# Patient Record
Sex: MALE | Race: WHITE | NOT HISPANIC OR LATINO | URBAN - METROPOLITAN AREA
[De-identification: names, ages, dates, MRNs, and addresses within clinical notes are randomized per-mention and may not be internally consistent; named-entity substitution may affect disease eponyms.]

---

## 2024-10-09 VITALS
HEIGHT: 72 IN | SYSTOLIC BLOOD PRESSURE: 130 MMHG | OXYGEN SATURATION: 96 % | TEMPERATURE: 97 F | WEIGHT: 300.05 LBS | RESPIRATION RATE: 17 BRPM | HEART RATE: 76 BPM | DIASTOLIC BLOOD PRESSURE: 80 MMHG

## 2024-10-09 RX ORDER — ASPIRIN/MAG CARB/ALUMINUM AMIN 325 MG
81 TABLET ORAL ONCE
Refills: 0 | Status: DISCONTINUED | OUTPATIENT
Start: 2024-10-21 | End: 2024-11-04

## 2024-10-09 RX ORDER — CHLORHEXIDINE GLUCONATE 40 MG/ML
1 SOLUTION TOPICAL ONCE
Refills: 0 | Status: DISCONTINUED | OUTPATIENT
Start: 2024-10-21 | End: 2024-11-04

## 2024-10-09 RX ORDER — CLOPIDOGREL 75 MG/1
600 TABLET ORAL ONCE
Refills: 0 | Status: DISCONTINUED | OUTPATIENT
Start: 2024-10-21 | End: 2024-11-04

## 2024-10-09 NOTE — H&P ADULT - ASSESSMENT
54 yo M, obese, PMHx only notable for HLD, presented to Dr Salazar with c/o SOB after walking up a few flights of stairs of walking a few blocks that he has been experiencing for several weeks, who now presents for cardiac catheterization with possible intervention if clinically indicated in light of risk factors, CCS class II anginal-equivalent symptoms and abnormal EST.    - ASA II Mallampati II  - VSS  - Patient is a candidate for moderate sedation  - Labs reviewed by PA - H/H 14.6/44.3   K 4.3   Mg 2.1  - GFR/Cr 16/0.97  - EKG - sinus rhythm, HR 93, TWI leads II, III, aVF, V5, V6  - LOAD - Patient is compliant with ASA and Plavix which he started 2 weeks ago, took both medications this morning  - FLUIDS -  ml bolus given x1; NS 75 ml/hr given x 2hr     Risks & benefits of procedure and alternative therapy have been explained to the patient including but not limited to: allergic reaction, bleeding w/possible need for blood transfusion, infection, renal and vascular compromise, limb damage, arrhythmia, stroke, vessel dissection/perforation, Myocardial infarction, emergent CABG. Informed consent obtained and in chart.

## 2024-10-09 NOTE — H&P ADULT - HISTORY OF PRESENT ILLNESS
Pharmacy: _______  Cardiologist: Dr Saalzar  Escort: _______    Ordered for ASA 81 mg / Plavix 600 mg,  ml bolus and 75 ml/hr x 2 hours maintenance    52 yo M, obese, PMHx only notable for HLD, presented to Dr Salazar with c/o SOB that he has been experiencing for several weeks/months. Patient otherwise denies CP, dizziness, palpitations, orthopnea/PND, leg swelling, LOC, bleeding, melena/hematochezia, fever, chills, URI symptoms, or recent illness.     Past Testing:  - TTE 10/1/2024 - mildly dilated LV with LVEF 50%, G2DD, mild AS 1.9 cm2,   - EST (no date listed) - positive stress test by exercise criteria - 1 mm STD in lateral leads    In light of risk factors, CCS class ____ anginal-equivalent symptoms and abnormal EST, pt now presents for cardiac catheterization with possible intervention if clinically indicated.      Meds: Aspirin 81 mg, Crestor 5 mg, Toprol 25 mg,    Cardiologist: Dr Salazar  Pharmacy:   Escort:     54 yo M, obese, PMHx only notable for HLD, presented to Dr Salazar with c/o SOB that he has been experiencing for several weeks/months. Patient otherwise denies CP, dizziness, palpitations, orthopnea/PND, leg swelling, LOC, bleeding, melena/hematochezia, fever, chills, URI symptoms, or recent illness.     Past Testing:  - TTE 10/1/2024 - mildly dilated LV with LVEF 50%, G2DD, mild AS, trace-mild MR, trace TR   - EST 10/7/24 - positive stress test by exercise criteria - 1 mm STD in lateral leads     In light of risk factors, CCS class ____ anginal-equivalent symptoms and abnormal EST, pt now presents for cardiac catheterization with possible intervention if clinically indicated. Cardiologist: Dr Salazar  Pharmacy: [facility] [address]  Escort: wife Lora    54 yo M, obese, PMHx only notable for HLD, presented to Dr Salazar with c/o SOB after walking up a few flights of stairs of walking a few blocks that he has been experiencing for several weeks. The patient walks about 4 miles daily and feels multiple episodes of SOB while walking. He also endorses episodes of excessive perspiration especially when eating, for the last 2 weeks, per patient he runs warm usually but has been perspiring more lately. Patient otherwise denies CP, dizziness, palpitations, orthopnea/PND, leg swelling, LOC, bleeding, melena/hematochezia, fever, chills, URI symptoms, or recent illness.     Past Testing:  - TTE 10/1/2024 - mildly dilated LV with LVEF 50%, G2DD, mild AS, trace-mild MR, trace TR   - EST 10/7/24 - positive stress test by exercise criteria - 1 mm STD in lateral leads     In light of risk factors, CCS class II anginal-equivalent symptoms and abnormal EST, pt now presents for cardiac catheterization with possible intervention if clinically indicated. Cardiologist: Dr Salazar  Pharmacy: [facility] [address]  Escort: wife Lora    54 yo M, obese, PMHx only notable for HLD, presented to Dr Salazar with c/o SOB after walking up a few flights of stairs of walking a few blocks that he has been experiencing for several weeks. The patient walks about 4 miles daily and feels multiple episodes of SOB while walking. He also endorses fatigue, occasionally waking up at night feeling SOB, and episodes of excessive perspiration especially when eating for the last 2 weeks, per patient he runs warm usually but has been perspiring more lately. Patient reports a productive cough which his PCP has attributed to post nasal drip but otherwise denies CP, dizziness, palpitations, orthopnea, leg swelling, LOC, bleeding, hematuria, melena/hematochezia, fever, chills, or recent illness.     Past Testing:  - TTE 10/1/2024 - mildly dilated LV with LVEF 50%, G2DD, mild AS, trace-mild MR, trace TR   - EST 10/7/24 - positive stress test by exercise criteria - 1 mm STD in lateral leads     In light of risk factors, CCS class II anginal-equivalent symptoms and abnormal EST, pt now presents for cardiac catheterization with possible intervention if clinically indicated.

## 2024-10-09 NOTE — H&P ADULT - NSHPLABSRESULTS_GEN_ALL_CORE
14.6   8.84  )-----------( 264      ( 21 Oct 2024 06:40 )             44.3       10-21    140  |  104  |  16  ----------------------------<  139[H]  4.3   |  25  |  0.97    Ca    9.1      21 Oct 2024 06:40  Mg     2.1     10-21    TPro  6.9  /  Alb  4.2  /  TBili  0.8  /  DBili  x   /  AST  22  /  ALT  30  /  AlkPhos  53  10-21      PT/INR - ( 21 Oct 2024 06:40 )   PT: 10.2 sec;   INR: 0.87          PTT - ( 21 Oct 2024 06:40 )  PTT:33.3 sec    CARDIAC MARKERS ( 21 Oct 2024 06:40 )  x     / x     / x     / x     / 5.0 ng/mL      Urinalysis Basic - ( 21 Oct 2024 06:40 )    Color: x / Appearance: x / SG: x / pH: x  Gluc: 139 mg/dL / Ketone: x  / Bili: x / Urobili: x   Blood: x / Protein: x / Nitrite: x   Leuk Esterase: x / RBC: x / WBC x   Sq Epi: x / Non Sq Epi: x / Bacteria: x      EKG: sinus rhythm, HR 93TWI II, III, aVF, V5, V6

## 2024-10-21 ENCOUNTER — OUTPATIENT (OUTPATIENT)
Dept: OUTPATIENT SERVICES | Facility: HOSPITAL | Age: 54
LOS: 1 days | Discharge: HOME | End: 2024-10-21
Payer: COMMERCIAL

## 2024-10-21 LAB
ALBUMIN SERPL ELPH-MCNC: 4.2 G/DL — SIGNIFICANT CHANGE UP (ref 3.3–5)
ALP SERPL-CCNC: 53 U/L — SIGNIFICANT CHANGE UP (ref 40–120)
ALT FLD-CCNC: 30 U/L — SIGNIFICANT CHANGE UP (ref 10–45)
ANION GAP SERPL CALC-SCNC: 11 MMOL/L — SIGNIFICANT CHANGE UP (ref 5–17)
APTT BLD: 33.3 SEC — SIGNIFICANT CHANGE UP (ref 24.5–35.6)
AST SERPL-CCNC: 22 U/L — SIGNIFICANT CHANGE UP (ref 10–40)
BASOPHILS # BLD AUTO: 0.04 K/UL — SIGNIFICANT CHANGE UP (ref 0–0.2)
BASOPHILS NFR BLD AUTO: 0.5 % — SIGNIFICANT CHANGE UP (ref 0–2)
BILIRUB SERPL-MCNC: 0.8 MG/DL — SIGNIFICANT CHANGE UP (ref 0.2–1.2)
BLOOD GAS VENOUS - BLOOD UREA NITROGEN: 16 MG/DL — SIGNIFICANT CHANGE UP (ref 7–23)
BLOOD GAS VENOUS - BLOOD UREA NITROGEN: 16 MG/DL — SIGNIFICANT CHANGE UP (ref 7–23)
BLOOD GAS VENOUS - CREATININE: 1 MG/DL — SIGNIFICANT CHANGE UP (ref 0.5–1.3)
BLOOD GAS VENOUS - CREATININE: 1 MG/DL — SIGNIFICANT CHANGE UP (ref 0.5–1.3)
BUN SERPL-MCNC: 16 MG/DL — SIGNIFICANT CHANGE UP (ref 7–23)
CA-I SERPL-SCNC: 1.22 MMOL/L — SIGNIFICANT CHANGE UP (ref 1.15–1.33)
CA-I SERPL-SCNC: 1.22 MMOL/L — SIGNIFICANT CHANGE UP (ref 1.15–1.33)
CALCIUM SERPL-MCNC: 9.1 MG/DL — SIGNIFICANT CHANGE UP (ref 8.4–10.5)
CHLORIDE BLDV-SCNC: 106 MMOL/L — SIGNIFICANT CHANGE UP (ref 96–108)
CHLORIDE BLDV-SCNC: 106 MMOL/L — SIGNIFICANT CHANGE UP (ref 96–108)
CHLORIDE SERPL-SCNC: 104 MMOL/L — SIGNIFICANT CHANGE UP (ref 96–108)
CHOLEST SERPL-MCNC: 145 MG/DL — SIGNIFICANT CHANGE UP
CK MB CFR SERPL CALC: 5 NG/ML — SIGNIFICANT CHANGE UP (ref 0–6.7)
CK SERPL-CCNC: 163 U/L — SIGNIFICANT CHANGE UP (ref 30–200)
CO2 BLDV-SCNC: 25 MMOL/L — SIGNIFICANT CHANGE UP (ref 22–26)
CO2 BLDV-SCNC: 25 MMOL/L — SIGNIFICANT CHANGE UP (ref 22–26)
CO2 SERPL-SCNC: 25 MMOL/L — SIGNIFICANT CHANGE UP (ref 22–31)
CREAT SERPL-MCNC: 0.97 MG/DL — SIGNIFICANT CHANGE UP (ref 0.5–1.3)
EGFR: 95 ML/MIN/1.73M2 — SIGNIFICANT CHANGE UP
EOSINOPHIL # BLD AUTO: 0.25 K/UL — SIGNIFICANT CHANGE UP (ref 0–0.5)
EOSINOPHIL NFR BLD AUTO: 2.8 % — SIGNIFICANT CHANGE UP (ref 0–6)
GAS PNL BLDV: 136 MMOL/L — SIGNIFICANT CHANGE UP (ref 136–145)
GAS PNL BLDV: 136 MMOL/L — SIGNIFICANT CHANGE UP (ref 136–145)
GLUCOSE BLDV-MCNC: 139 MG/DL — HIGH (ref 70–99)
GLUCOSE BLDV-MCNC: 139 MG/DL — HIGH (ref 70–99)
GLUCOSE SERPL-MCNC: 139 MG/DL — HIGH (ref 70–99)
HCT VFR BLD CALC: 44.3 % — SIGNIFICANT CHANGE UP (ref 39–50)
HCT VFR BLDA CALC: 41 % — SIGNIFICANT CHANGE UP
HCT VFR BLDA CALC: 41 % — SIGNIFICANT CHANGE UP
HDLC SERPL-MCNC: 61 MG/DL — SIGNIFICANT CHANGE UP
HGB BLD-MCNC: 14.6 G/DL — SIGNIFICANT CHANGE UP (ref 13–17)
IMM GRANULOCYTES NFR BLD AUTO: 0.3 % — SIGNIFICANT CHANGE UP (ref 0–0.9)
INR BLD: 0.87 — SIGNIFICANT CHANGE UP (ref 0.85–1.16)
LACTATE BLDV-MCNC: 1.3 MMOL/L — SIGNIFICANT CHANGE UP (ref 0.5–2)
LACTATE BLDV-MCNC: 1.3 MMOL/L — SIGNIFICANT CHANGE UP (ref 0.5–2)
LIPID PNL WITH DIRECT LDL SERPL: 69 MG/DL — SIGNIFICANT CHANGE UP
LYMPHOCYTES # BLD AUTO: 2.41 K/UL — SIGNIFICANT CHANGE UP (ref 1–3.3)
LYMPHOCYTES # BLD AUTO: 27.3 % — SIGNIFICANT CHANGE UP (ref 13–44)
MAGNESIUM SERPL-MCNC: 2.1 MG/DL — SIGNIFICANT CHANGE UP (ref 1.6–2.6)
MCHC RBC-ENTMCNC: 31.8 PG — SIGNIFICANT CHANGE UP (ref 27–34)
MCHC RBC-ENTMCNC: 33 GM/DL — SIGNIFICANT CHANGE UP (ref 32–36)
MCV RBC AUTO: 96.5 FL — SIGNIFICANT CHANGE UP (ref 80–100)
MONOCYTES # BLD AUTO: 0.74 K/UL — SIGNIFICANT CHANGE UP (ref 0–0.9)
MONOCYTES NFR BLD AUTO: 8.4 % — SIGNIFICANT CHANGE UP (ref 2–14)
NEUTROPHILS # BLD AUTO: 5.37 K/UL — SIGNIFICANT CHANGE UP (ref 1.8–7.4)
NEUTROPHILS NFR BLD AUTO: 60.7 % — SIGNIFICANT CHANGE UP (ref 43–77)
NON HDL CHOLESTEROL: 84 MG/DL — SIGNIFICANT CHANGE UP
NRBC # BLD: 0 /100 WBCS — SIGNIFICANT CHANGE UP (ref 0–0)
PCO2 BLDV: 49 MMHG — SIGNIFICANT CHANGE UP (ref 42–55)
PCO2 BLDV: 49 MMHG — SIGNIFICANT CHANGE UP (ref 42–55)
PH BLDV: 7.37 — SIGNIFICANT CHANGE UP (ref 7.32–7.43)
PH BLDV: 7.37 — SIGNIFICANT CHANGE UP (ref 7.32–7.43)
PLATELET # BLD AUTO: 264 K/UL — SIGNIFICANT CHANGE UP (ref 150–400)
POCT ISTAT CREATININE: 1 MG/DL — SIGNIFICANT CHANGE UP (ref 0.5–1.3)
POTASSIUM BLDV-SCNC: 4.5 MMOL/L — SIGNIFICANT CHANGE UP (ref 3.5–5.1)
POTASSIUM BLDV-SCNC: 4.5 MMOL/L — SIGNIFICANT CHANGE UP (ref 3.5–5.1)
POTASSIUM SERPL-MCNC: 4.3 MMOL/L — SIGNIFICANT CHANGE UP (ref 3.5–5.3)
POTASSIUM SERPL-SCNC: 4.3 MMOL/L — SIGNIFICANT CHANGE UP (ref 3.5–5.3)
PROT SERPL-MCNC: 6.9 G/DL — SIGNIFICANT CHANGE UP (ref 6–8.3)
PROTHROM AB SERPL-ACNC: 10.2 SEC — SIGNIFICANT CHANGE UP (ref 9.9–13.4)
RBC # BLD: 4.59 M/UL — SIGNIFICANT CHANGE UP (ref 4.2–5.8)
RBC # FLD: 12.2 % — SIGNIFICANT CHANGE UP (ref 10.3–14.5)
SODIUM SERPL-SCNC: 140 MMOL/L — SIGNIFICANT CHANGE UP (ref 135–145)
TRIGL SERPL-MCNC: 77 MG/DL — SIGNIFICANT CHANGE UP
WBC # BLD: 8.84 K/UL — SIGNIFICANT CHANGE UP (ref 3.8–10.5)
WBC # FLD AUTO: 8.84 K/UL — SIGNIFICANT CHANGE UP (ref 3.8–10.5)

## 2024-10-21 PROCEDURE — 82550 ASSAY OF CK (CPK): CPT

## 2024-10-21 PROCEDURE — 93458 L HRT ARTERY/VENTRICLE ANGIO: CPT

## 2024-10-21 PROCEDURE — C1769: CPT

## 2024-10-21 PROCEDURE — 80053 COMPREHEN METABOLIC PANEL: CPT

## 2024-10-21 PROCEDURE — C1887: CPT

## 2024-10-21 PROCEDURE — 83735 ASSAY OF MAGNESIUM: CPT

## 2024-10-21 PROCEDURE — 85730 THROMBOPLASTIN TIME PARTIAL: CPT

## 2024-10-21 PROCEDURE — 93458 L HRT ARTERY/VENTRICLE ANGIO: CPT | Mod: 26

## 2024-10-21 PROCEDURE — 85610 PROTHROMBIN TIME: CPT

## 2024-10-21 PROCEDURE — 80061 LIPID PANEL: CPT

## 2024-10-21 PROCEDURE — 99152 MOD SED SAME PHYS/QHP 5/>YRS: CPT

## 2024-10-21 PROCEDURE — C1894: CPT

## 2024-10-21 PROCEDURE — 82553 CREATINE MB FRACTION: CPT

## 2024-10-21 PROCEDURE — 85025 COMPLETE CBC W/AUTO DIFF WBC: CPT

## 2024-10-21 PROCEDURE — 82565 ASSAY OF CREATININE: CPT

## 2024-10-21 RX ORDER — SODIUM CHLORIDE 9 MG/ML
1000 INJECTION, SOLUTION INTRAMUSCULAR; INTRAVENOUS; SUBCUTANEOUS
Refills: 0 | Status: DISCONTINUED | OUTPATIENT
Start: 2024-10-21 | End: 2024-11-04

## 2024-10-21 RX ORDER — SODIUM CHLORIDE 9 MG/ML
1000 INJECTION, SOLUTION INTRAMUSCULAR; INTRAVENOUS; SUBCUTANEOUS
Refills: 0 | Status: DISCONTINUED | OUTPATIENT
Start: 2024-10-21 | End: 2024-10-21

## 2024-10-21 RX ORDER — METOPROLOL TARTRATE 50 MG
1 TABLET ORAL
Refills: 0 | DISCHARGE

## 2024-10-21 RX ORDER — ASPIRIN/MAG CARB/ALUMINUM AMIN 325 MG
1 TABLET ORAL
Refills: 0 | DISCHARGE

## 2024-10-21 RX ORDER — SODIUM CHLORIDE 9 MG/ML
250 INJECTION, SOLUTION INTRAMUSCULAR; INTRAVENOUS; SUBCUTANEOUS ONCE
Refills: 0 | Status: COMPLETED | OUTPATIENT
Start: 2024-10-21 | End: 2024-10-21

## 2024-10-21 RX ORDER — RANOLAZINE 500 MG/1
1 TABLET, FILM COATED, EXTENDED RELEASE ORAL
Refills: 0 | DISCHARGE

## 2024-10-21 RX ORDER — CLOPIDOGREL 75 MG/1
1 TABLET ORAL
Refills: 0 | DISCHARGE

## 2024-10-21 RX ORDER — ROSUVASTATIN CALCIUM 10 MG
1 TABLET ORAL
Refills: 0 | DISCHARGE

## 2024-10-21 RX ADMIN — SODIUM CHLORIDE 125 MILLILITER(S): 9 INJECTION, SOLUTION INTRAMUSCULAR; INTRAVENOUS; SUBCUTANEOUS at 09:21

## 2024-10-21 RX ADMIN — SODIUM CHLORIDE 1000 MILLILITER(S): 9 INJECTION, SOLUTION INTRAMUSCULAR; INTRAVENOUS; SUBCUTANEOUS at 07:39

## 2024-10-21 RX ADMIN — SODIUM CHLORIDE 75 MILLILITER(S): 9 INJECTION, SOLUTION INTRAMUSCULAR; INTRAVENOUS; SUBCUTANEOUS at 07:39

## 2024-10-21 NOTE — PROGRESS NOTE ADULT - SUBJECTIVE AND OBJECTIVE BOX
INTERVENTIONAL CARDIOLOGY PA SDA DISCHARGE NOTE    Patient without complaints. Ambulated and voided without difficulties    Afebrile, VSS    PHYSICAL EXAM:    Ext - Rt Radial: Hemostasis achieved with manual release of Hemoband. Dressing C/D/I -- no oozing, bleeding, or hematoma noted. Sensations intact with 2+ radial pulses    		      MEDICATIONS:  ASA 81mg QD   Plavix 75mg QD  Rosuvastatin 20mg QD  Metoprolol XL 100mg QD  Ranolazine 500mg QD    A/P:      53M with hx of HLD, presented to Dr Salazar with c/o SOB after walking up a few flights of stairs of walking a few blocks that he has been experiencing for several weeks, who now presents for cardiac catheterization with possible intervention if clinically indicated in light of risk factors, CCS class II anginal-equivalent symptoms and abnormal EST.    Pt is now s/p LHC (10/21/24): LM/LAD/LCx MLI and dRCA 30%; EDP 23 -- via [facility] [IC: Dr. SON Cervantes / Fellow: SON Clemons]      - Stable for discharge as per attending Dr. Cervantes after bed rest, pt voids, groin/wrist stable and 30 minutes of ambulation.  - Follow-up with PMD/Cardiologist Dr. Salazar in 1-2 weeks  - Discharged forms signed and copies in chart      INTERVENTIONAL CARDIOLOGY PA SDA DISCHARGE NOTE    Patient without complaints. Ambulated and voided without difficulties    Afebrile, VSS    PHYSICAL EXAM:    Ext - Rt Radial: Hemostasis achieved with manual release of Hemoband. Dressing C/D/I -- no oozing, bleeding, or hematoma noted. Sensations intact with 2+ radial pulses    		      MEDICATIONS:  ASA 81mg QD   Plavix 75mg QD  Rosuvastatin 20mg QD  Metoprolol XL 100mg QD  Ranolazine 500mg QD    A/P:      53M with hx of HLD, presented to Dr Salazar with c/o SOB after walking up a few flights of stairs of walking a few blocks that he has been experiencing for several weeks, who now presents for cardiac catheterization with possible intervention if clinically indicated in light of risk factors, CCS class II anginal-equivalent symptoms and abnormal EST.    Pt is now s/p LHC (10/21/24): LM/LAD/LCx MLI and dRCA 30%; EDP 23 -- via [facility] [IC: Dr. SON Cervantes / Fellow: SON Marcelo]      - Stable for discharge as per attending Dr. Cervantes after bed rest, pt voids, groin/wrist stable and 30 minutes of ambulation.  - Follow-up with PMD/Cardiologist Dr. Salazar in 1-2 weeks  - Discharged forms signed and copies in chart

## 2024-10-31 DIAGNOSIS — R94.39 ABNORMAL RESULT OF OTHER CARDIOVASCULAR FUNCTION STUDY: ICD-10-CM

## 2024-10-31 DIAGNOSIS — I25.110 ATHEROSCLEROTIC HEART DISEASE OF NATIVE CORONARY ARTERY WITH UNSTABLE ANGINA PECTORIS: ICD-10-CM
